# Patient Record
Sex: MALE | Race: WHITE | Employment: FULL TIME | URBAN - NONMETROPOLITAN AREA
[De-identification: names, ages, dates, MRNs, and addresses within clinical notes are randomized per-mention and may not be internally consistent; named-entity substitution may affect disease eponyms.]

---

## 2020-07-29 ENCOUNTER — HOSPITAL ENCOUNTER (EMERGENCY)
Age: 30
Discharge: HOME | End: 2020-07-29
Attending: EMERGENCY MEDICINE
Payer: COMMERCIAL

## 2020-07-29 ENCOUNTER — APPOINTMENT (OUTPATIENT)
Dept: GENERAL RADIOLOGY | Age: 30
End: 2020-07-29
Payer: COMMERCIAL

## 2020-07-29 VITALS
BODY MASS INDEX: 41.99 KG/M2 | DIASTOLIC BLOOD PRESSURE: 77 MMHG | WEIGHT: 310 LBS | OXYGEN SATURATION: 96 % | RESPIRATION RATE: 17 BRPM | TEMPERATURE: 97.5 F | HEART RATE: 68 BPM | HEIGHT: 72 IN | SYSTOLIC BLOOD PRESSURE: 135 MMHG

## 2020-07-29 PROCEDURE — 73630 X-RAY EXAM OF FOOT: CPT

## 2020-07-29 PROCEDURE — 6370000000 HC RX 637 (ALT 250 FOR IP): Performed by: EMERGENCY MEDICINE

## 2020-07-29 PROCEDURE — 73610 X-RAY EXAM OF ANKLE: CPT

## 2020-07-29 PROCEDURE — 4500000028 HC INTERMEDIATE PROCEDURE

## 2020-07-29 PROCEDURE — 99283 EMERGENCY DEPT VISIT LOW MDM: CPT

## 2020-07-29 RX ORDER — HYDROCODONE BITARTRATE AND ACETAMINOPHEN 5; 325 MG/1; MG/1
1 TABLET ORAL ONCE
Status: COMPLETED | OUTPATIENT
Start: 2020-07-29 | End: 2020-07-29

## 2020-07-29 RX ORDER — HYDROCODONE BITARTRATE AND ACETAMINOPHEN 5; 325 MG/1; MG/1
1 TABLET ORAL EVERY 4 HOURS PRN
Qty: 15 TABLET | Refills: 0 | Status: SHIPPED | OUTPATIENT
Start: 2020-07-29 | End: 2020-08-03

## 2020-07-29 RX ORDER — IBUPROFEN 600 MG/1
600 TABLET ORAL EVERY 6 HOURS PRN
Qty: 20 TABLET | Refills: 0 | Status: SHIPPED | OUTPATIENT
Start: 2020-07-29 | End: 2020-08-25

## 2020-07-29 RX ORDER — ESOMEPRAZOLE MAGNESIUM 40 MG/1
20 FOR SUSPENSION ORAL DAILY
COMMUNITY
End: 2020-08-06

## 2020-07-29 RX ORDER — TRAZODONE HYDROCHLORIDE 100 MG/1
100 TABLET ORAL NIGHTLY
COMMUNITY

## 2020-07-29 RX ORDER — IBUPROFEN 600 MG/1
600 TABLET ORAL ONCE
Status: COMPLETED | OUTPATIENT
Start: 2020-07-29 | End: 2020-07-29

## 2020-07-29 RX ORDER — MONTELUKAST SODIUM 10 MG/1
10 TABLET ORAL NIGHTLY
COMMUNITY

## 2020-07-29 RX ORDER — M-VIT,TX,IRON,MINS/CALC/FOLIC 27MG-0.4MG
1 TABLET ORAL DAILY
COMMUNITY

## 2020-07-29 RX ADMIN — IBUPROFEN 600 MG: 600 TABLET, FILM COATED ORAL at 10:29

## 2020-07-29 RX ADMIN — HYDROCODONE BITARTRATE AND ACETAMINOPHEN 1 TABLET: 5; 325 TABLET ORAL at 10:29

## 2020-07-29 ASSESSMENT — PAIN DESCRIPTION - ORIENTATION: ORIENTATION: RIGHT

## 2020-07-29 ASSESSMENT — PAIN SCALES - GENERAL
PAINLEVEL_OUTOF10: 4
PAINLEVEL_OUTOF10: 4

## 2020-07-29 ASSESSMENT — PAIN DESCRIPTION - DESCRIPTORS: DESCRIPTORS: SHOOTING

## 2020-07-29 ASSESSMENT — PAIN - FUNCTIONAL ASSESSMENT: PAIN_FUNCTIONAL_ASSESSMENT: PREVENTS OR INTERFERES SOME ACTIVE ACTIVITIES AND ADLS

## 2020-07-29 ASSESSMENT — PAIN DESCRIPTION - FREQUENCY: FREQUENCY: CONTINUOUS

## 2020-07-29 ASSESSMENT — PAIN DESCRIPTION - LOCATION: LOCATION: ANKLE

## 2020-07-29 ASSESSMENT — PAIN DESCRIPTION - PAIN TYPE: TYPE: ACUTE PAIN

## 2020-07-29 ASSESSMENT — PAIN DESCRIPTION - PROGRESSION: CLINICAL_PROGRESSION: RAPIDLY WORSENING

## 2020-07-29 ASSESSMENT — PAIN DESCRIPTION - ONSET: ONSET: SUDDEN
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
## 2020-07-30 ENCOUNTER — OFFICE VISIT (OUTPATIENT)
Dept: ORTHOPEDIC SURGERY | Age: 30
End: 2020-07-30
Payer: COMMERCIAL

## 2020-07-30 VITALS
BODY MASS INDEX: 41.99 KG/M2 | HEART RATE: 78 BPM | RESPIRATION RATE: 18 BRPM | HEIGHT: 72 IN | OXYGEN SATURATION: 96 % | WEIGHT: 310 LBS

## 2020-07-30 PROCEDURE — 99202 OFFICE O/P NEW SF 15 MIN: CPT | Performed by: PHYSICIAN ASSISTANT

## 2020-07-30 RX ORDER — ESCITALOPRAM OXALATE 10 MG/1
10 TABLET ORAL DAILY
COMMUNITY

## 2020-07-30 NOTE — PATIENT INSTRUCTIONS
Fitted for tall cam boot  Remain non weightbearing except for within the heel  Remove for hygiene  Continue to take Naproxen and Norco as directed  Follow up next week with x-rays
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
## 2020-07-31 ASSESSMENT — ENCOUNTER SYMPTOMS
GASTROINTESTINAL NEGATIVE: 1
EYES NEGATIVE: 1
RESPIRATORY NEGATIVE: 1

## 2020-08-06 ENCOUNTER — OFFICE VISIT (OUTPATIENT)
Dept: ORTHOPEDIC SURGERY | Age: 30
End: 2020-08-06
Payer: COMMERCIAL

## 2020-08-06 ENCOUNTER — HOSPITAL ENCOUNTER (OUTPATIENT)
Age: 30
Discharge: HOME | End: 2020-08-06
Payer: COMMERCIAL

## 2020-08-06 VITALS — WEIGHT: 310 LBS | RESPIRATION RATE: 18 BRPM | BODY MASS INDEX: 41.99 KG/M2 | HEIGHT: 72 IN

## 2020-08-06 PROCEDURE — U0002 COVID-19 LAB TEST NON-CDC: HCPCS

## 2020-08-06 PROCEDURE — 99214 OFFICE O/P EST MOD 30 MIN: CPT | Performed by: ORTHOPAEDIC SURGERY

## 2020-08-06 RX ORDER — OMEPRAZOLE 40 MG/1
CAPSULE, DELAYED RELEASE ORAL
COMMUNITY
Start: 2020-08-01

## 2020-08-06 NOTE — PROGRESS NOTES
Patient here for a new patient follow up per Magdalena CAMARGO for evaluation of a right foot 5th metatarsal fracture. He is NWB with crutches and short boot. He reports multiple injuries in this foot in the past but no surgeries and a right ankle fracture all treated non-surgical. He is not having much pain today but is doing pretty well staying off his foot.     DOI: 7/29/2020    Provider needs to conduct a hands on physical today due to patients injury/diagnosis

## 2020-08-06 NOTE — PATIENT INSTRUCTIONS
Right foot 5th metatarsal ORIF discussed   Will proceed with surgery   No meds list provided   Consent to be signed day of surgery   Obtain any clearances as needed, as discussed   Follow up for pre-op testing(COVID)/surgery as discussed     Call office with any questions ([facility] surgery scheduler) [phone]

## 2020-08-07 ENCOUNTER — TELEPHONE (OUTPATIENT)
Dept: ORTHOPEDIC SURGERY | Age: 30
End: 2020-08-07

## 2020-08-07 ASSESSMENT — ENCOUNTER SYMPTOMS
CHEST TIGHTNESS: 0
EYE PAIN: 0
VOMITING: 0
EYE REDNESS: 0
COLOR CHANGE: 0
SHORTNESS OF BREATH: 0
WHEEZING: 0

## 2020-08-07 NOTE — PROGRESS NOTES
8/6/2020   Chief Complaint   Patient presents with    Fracture     right foot 5th metatarsal 7/29/2020        History of Present Illness:                             Juan Patel is a 26 y.o. male who presents today for evaluation of his right foot fracture. He saw Mich Quezada last week following an injury to his right foot. He was stepping down from a high surface and felt a twisting injury with a sudden pop and pain in the lateral border of his foot. He has been immobilized in his fracture boot since the injury but has been not nonweightbearing. He does have pain and feelings of instability with rocking forward on the ball of his foot. He had no problems or pain in the area of his foot prior to the injury. Patient here for a new patient follow up per Mich CAMARGO for evaluation of a right foot 5th metatarsal fracture. He is NWB with crutches and short boot. He reports multiple injuries in this foot in the past but no surgeries and a right ankle fracture all treated non-surgical. He is not having much pain today but is doing pretty well staying off his foot.     DOI: 7/29/2020    Medical History  Patient's medications, allergies, past medical, surgical, social and family histories were reviewed and updated as appropriate. Past Medical History:   Diagnosis Date    Anxiety     Depression     No pertinent past medical history     Sleep apnea      No family history on file.   Social History     Socioeconomic History    Marital status:      Spouse name: None    Number of children: None    Years of education: None    Highest education level: None   Occupational History    None   Social Needs    Financial resource strain: None    Food insecurity     Worry: None     Inability: None    Transportation needs     Medical: None     Non-medical: None   Tobacco Use    Smoking status: Never Smoker    Smokeless tobacco: Never Used   Substance and Sexual Activity    Alcohol use: No    Drug use: No    Sexual activity: None   Lifestyle    Physical activity     Days per week: None     Minutes per session: None    Stress: None   Relationships    Social connections     Talks on phone: None     Gets together: None     Attends Yazdanism service: None     Active member of club or organization: None     Attends meetings of clubs or organizations: None     Relationship status: None    Intimate partner violence     Fear of current or ex partner: None     Emotionally abused: None     Physically abused: None     Forced sexual activity: None   Other Topics Concern    None   Social History Narrative    None     Current Outpatient Medications   Medication Sig Dispense Refill    omeprazole (PRILOSEC) 40 MG delayed release capsule       escitalopram (LEXAPRO) 10 MG tablet Take 10 mg by mouth daily      Multiple Vitamins-Minerals (THERAPEUTIC MULTIVITAMIN-MINERALS) tablet Take 1 tablet by mouth daily      montelukast (SINGULAIR) 10 MG tablet Take 10 mg by mouth nightly      traZODone (DESYREL) 100 MG tablet Take 100 mg by mouth nightly      ibuprofen (IBU) 600 MG tablet Take 1 tablet by mouth every 6 hours as needed for Pain 20 tablet 0     No current facility-administered medications for this visit. No Known Allergies    Review of Systems:   Review of Systems   Constitutional: Negative for chills and fever. HENT: Negative for congestion and sneezing. Eyes: Negative for pain and redness. Respiratory: Negative for chest tightness, shortness of breath and wheezing. Cardiovascular: Negative for chest pain and palpitations. Gastrointestinal: Negative for vomiting. Musculoskeletal: Positive for arthralgias. Skin: Negative for color change and rash. Psychiatric/Behavioral: Negative for agitation. The patient is not nervous/anxious.                                                 Examination:  General Exam:  Vitals: Resp 18   Ht 6' (1.829 m)   Wt (!) 310 lb (140.6 kg)   BMI 42.04 kg/m²    Physical Exam  Vitals signs and nursing note reviewed. Constitutional:       Appearance: Normal appearance. HENT:      Head: Normocephalic and atraumatic. Eyes:      Conjunctiva/sclera: Conjunctivae normal.      Pupils: Pupils are equal, round, and reactive to light. Neck:      Musculoskeletal: Normal range of motion. Pulmonary:      Effort: Pulmonary effort is normal.   Musculoskeletal:      Right knee: He exhibits normal range of motion, no swelling, no effusion, no ecchymosis, normal alignment, no LCL laxity, no bony tenderness and no MCL laxity. No tenderness found. No medial joint line and no lateral joint line tenderness noted. Left foot: Normal range of motion and normal capillary refill. No tenderness, bony tenderness, swelling, crepitus, deformity or laceration. Comments: Right lower extremity:  There is tenderness to palpation and swelling directly over the fifth metatarsal maximally at the fracture site of the metadiaphyseal junction. There is normal position and alignment of the foot. Mild ecchymosis present laterally at the foot. No tenderness to palpation at the ankle, heel, or forefoot. Sensation is intact light touch throughout. 2+ DP pulse and brisk cap refill present. Range of motion present at the ankle but limited due to pain referred to the lateral border of the foot. Skin:     General: Skin is warm and dry. Neurological:      Mental Status: He is alert and oriented to person, place, and time.    Psychiatric:         Mood and Affect: Mood normal.         Behavior: Behavior normal.            Diagnostic testing:  X-rays reviewed in office, I independently reviewed the films in the office today:       Office Procedures:  Orders Placed This Encounter   Procedures    COVID-19 Ambulatory     Standing Status:   Future     Standing Expiration Date:   8/6/2021     Scheduling Instructions:      Saline media preferred given current shortage of viral transport media but both acceptable     Order Specific Question:   Is this test for diagnosis or screening? Answer:   Screening     Order Specific Question:   Symptomatic for COVID-19 as defined by CDC? Answer:   No     Order Specific Question:   Reason for Test     Answer:   Upcoming elective surgery/procedure/delivery, return to work, or discharge to another facility     Order Specific Question:   Hospitalized for COVID-19? Answer:   No     Order Specific Question:   Admitted to ICU for COVID-19? Answer:   No     Order Specific Question:   Employed in healthcare setting? Answer:   No     Order Specific Question:   Resident in a congregate (group) care setting? Answer:   No     Order Specific Question:   Previously tested for COVID-19? Answer:   No       Assessment and Plan  1. Right foot Wills fifth metatarsal fracture    I reviewed the x-rays with patient explained that there is a nondisplaced fracture through the metadiaphyseal region of his fifth metatarsal.  We discussed the features of the Wills fracture that may make it difficult to heal and also potentially prone for refracture. Given his age and activity level I feel he would benefit from surgical intervention for open reduction and internal fixation with an intramedullary screw. We discussed the benefits of surgery to provide consistent healing and reduce the risk of fracture nonunion and prolonged immobilization. We discussed the risks of surgery including pain at the surgical site, wound healing problems, infection, injury to nerves. He understands risks and benefits of both surgical and nonsurgical treatment and would like to proceed with surgical intervention to stabilize his fracture. All of his questions were answered and we will proceed with open reduction and internal fixation with an intramedullary screw.     The patient was counseled at length about the risks of brandon Covid-19 during their perioperative period and any

## 2020-08-08 LAB
SARS-COV-2: NOT DETECTED
SOURCE: NORMAL

## 2020-08-10 NOTE — PROGRESS NOTES
.Surgery 8/12/20 @ 1300, arrival 1100               1. Do not eat or drink anything within 8 hours of your surgery - unless instructed by your doctor prior to surgery. This includes                   no water, chewing gum or mints. 2. Follow your directions as prescribed by the doctor for your procedure and medications. Take omeprazole in am with a sip. 3. Check with your Doctor regarding stopping Plavix, Coumadin, Lovenox,Effient,Pradaxa,Xarelto, Fragmin or other blood thinners and                   follow their instructions. 4. Do not smoke, and do not drink any alcoholic beverages 24 hours prior to surgery. This includes NA Beer. 5. You may brush your teeth and gargle the morning of surgery. DO NOT SWALLOW WATER   6. You MUST make arrangements for a responsible adult to take you home after your surgery and be able to check on you every couple                   hours for the day. You will not be allowed to leave alone or drive yourself home. It is strongly suggested someone stay with you the first 24                   hrs. Your surgery will be cancelled if you do not have a ride home. 7. Please wear simple, loose fitting clothing to the hospital.  Alvin [place] not bring valuables (money, credit cards, checkbooks, etc.) Do not wear any                   makeup (including no eye makeup) or nail polish on your fingers or toes. 8. DO NOT wear any jewelry or piercings on day of surgery. All body piercing jewelry must be removed. 9. If you have dentures, they will be removed before going to the OR; we will provide you a container. If you wear contact lenses or glasses,                  they will be removed; please bring a case for them. 10. If you  have a Living Will and Durable Power of  for Healthcare, please bring in a copy. 11. Please bring picture ID,  insurance card, paperwork from the doctors office    (H & P, Consent, & card for implantable devices). 12. Take a shower the night before or morning of your procedure, do not apply any lotion, oil or powder. 13. Wear a mask covering your nose & mouth when entering the hospital. Have your covid-19 test performed within 7 days of your                  surgery. Quarantine yourself after the test until after your surgery.

## 2020-08-11 ENCOUNTER — ANESTHESIA EVENT (OUTPATIENT)
Dept: OPERATING ROOM | Age: 30
End: 2020-08-11
Payer: COMMERCIAL

## 2020-08-11 NOTE — ANESTHESIA PRE PROCEDURE
Department of Anesthesiology  Preprocedure Note       Name:  Bridger Messina   Age:  26 y.o.  :  1990                                          MRN:  [id number]         Date:  2020      Surgeon: Joleen Amaya):  Serena Trivedi MD    Procedure: Procedure(s):  RIGHT FOOT OPEN REDUCTION INTERNAL FIXATION OF FIFTH METATARSAL BONE    Medications prior to admission:   Prior to Admission medications    Medication Sig Start Date End Date Taking? Authorizing Provider   omeprazole (PRILOSEC) 40 MG delayed release capsule  20   Historical Provider, MD   escitalopram (LEXAPRO) 10 MG tablet Take 10 mg by mouth daily    Historical Provider, MD   Multiple Vitamins-Minerals (THERAPEUTIC MULTIVITAMIN-MINERALS) tablet Take 1 tablet by mouth daily    Historical Provider, MD   montelukast (SINGULAIR) 10 MG tablet Take 10 mg by mouth nightly    Historical Provider, MD   traZODone (DESYREL) 100 MG tablet Take 100 mg by mouth nightly    Historical Provider, MD   ibuprofen (IBU) 600 MG tablet Take 1 tablet by mouth every 6 hours as needed for Pain 20  Edenilson Soto MD       Current medications:    No current facility-administered medications for this encounter.       Current Outpatient Medications   Medication Sig Dispense Refill    omeprazole (PRILOSEC) 40 MG delayed release capsule       escitalopram (LEXAPRO) 10 MG tablet Take 10 mg by mouth daily      Multiple Vitamins-Minerals (THERAPEUTIC MULTIVITAMIN-MINERALS) tablet Take 1 tablet by mouth daily      montelukast (SINGULAIR) 10 MG tablet Take 10 mg by mouth nightly      traZODone (DESYREL) 100 MG tablet Take 100 mg by mouth nightly      ibuprofen (IBU) 600 MG tablet Take 1 tablet by mouth every 6 hours as needed for Pain 20 tablet 0       Allergies:  No Known Allergies    Problem List:    Patient Active Problem List   Diagnosis Code    Ankle fracture, right B38.851D       Past Medical History:        Diagnosis Date    Anxiety     Depression     GERD [facility], [address]    Drug/Infectious Status (If Applicable):  No results found for: HIV, HEPCAB    COVID-19 Screening (If Applicable):   Lab Results   Component Value Date    COVID19 NOT DETECTED 08/06/2020         Anesthesia Evaluation  Patient summary reviewed  Airway: Mallampati: II  TM distance: >3 FB   Neck ROM: full  Mouth opening: > = 3 FB Dental:          Pulmonary: breath sounds clear to auscultation  (+) sleep apnea:                             Cardiovascular:            Rhythm: regular             Beta Blocker:  Not on Beta Blocker      ROS comment: 1. Normal left ventricular wall thickness and size present. 2.  Left ventricular function is preserved, 50% to 55% present. 3.  No pericardial effusion noted. 4.  There is mild mitral and tricuspid regurgitation noted. 5.  Diastolic function is preserved. Neuro/Psych:   (+) depression/anxiety             GI/Hepatic/Renal:   (+) GERD:, morbid obesity          Endo/Other:              Pt had no PAT visit       Abdominal:   (+) obese,         Vascular:                                    Anesthesia Plan      general     ASA 2       Induction: intravenous. MIPS: Postoperative opioids intended. Anesthetic plan and risks discussed with patient. Plan discussed with CRNA.     Attending anesthesiologist reviewed and agrees with Pre Eval content

## 2020-08-12 ENCOUNTER — ANESTHESIA (OUTPATIENT)
Dept: OPERATING ROOM | Age: 30
End: 2020-08-12
Payer: COMMERCIAL

## 2020-08-12 ENCOUNTER — APPOINTMENT (OUTPATIENT)
Dept: GENERAL RADIOLOGY | Age: 30
End: 2020-08-12
Attending: ORTHOPAEDIC SURGERY
Payer: COMMERCIAL

## 2020-08-12 ENCOUNTER — HOSPITAL ENCOUNTER (OUTPATIENT)
Age: 30
Setting detail: OUTPATIENT SURGERY
Discharge: HOME | End: 2020-08-12
Attending: ORTHOPAEDIC SURGERY | Admitting: ORTHOPAEDIC SURGERY
Payer: COMMERCIAL

## 2020-08-12 VITALS
TEMPERATURE: 98.2 F | OXYGEN SATURATION: 95 % | DIASTOLIC BLOOD PRESSURE: 120 MMHG | RESPIRATION RATE: 11 BRPM | SYSTOLIC BLOOD PRESSURE: 136 MMHG

## 2020-08-12 VITALS
BODY MASS INDEX: 41.99 KG/M2 | WEIGHT: 310 LBS | SYSTOLIC BLOOD PRESSURE: 144 MMHG | HEART RATE: 61 BPM | TEMPERATURE: 96 F | DIASTOLIC BLOOD PRESSURE: 88 MMHG | RESPIRATION RATE: 16 BRPM | HEIGHT: 72 IN | OXYGEN SATURATION: 96 %

## 2020-08-12 LAB
ANION GAP SERPL CALCULATED.3IONS-SCNC: 10 MMOL/L (ref 4–16)
BUN BLDV-MCNC: 14 MG/DL (ref 6–23)
CALCIUM SERPL-MCNC: 9.1 MG/DL (ref 8.3–10.6)
CHLORIDE BLD-SCNC: 102 MMOL/L (ref 99–110)
CO2: 25 MMOL/L (ref 21–32)
CREAT SERPL-MCNC: 1.1 MG/DL (ref 0.9–1.3)
GFR AFRICAN AMERICAN: >60 ML/MIN/1.73M2
GFR NON-AFRICAN AMERICAN: >60 ML/MIN/1.73M2
GLUCOSE BLD-MCNC: 95 MG/DL (ref 70–99)
HCT VFR BLD CALC: 40 % (ref 42–52)
HEMOGLOBIN: 13 GM/DL (ref 13.5–18)
MCH RBC QN AUTO: 26.9 PG (ref 27–31)
MCHC RBC AUTO-ENTMCNC: 32.5 % (ref 32–36)
MCV RBC AUTO: 82.6 FL (ref 78–100)
PDW BLD-RTO: 13.7 % (ref 11.7–14.9)
PLATELET # BLD: 237 K/CU MM (ref 140–440)
PMV BLD AUTO: 9 FL (ref 7.5–11.1)
POTASSIUM SERPL-SCNC: 4.2 MMOL/L (ref 3.5–5.1)
RBC # BLD: 4.84 M/CU MM (ref 4.6–6.2)
SODIUM BLD-SCNC: 137 MMOL/L (ref 135–145)
WBC # BLD: 9.4 K/CU MM (ref 4–10.5)

## 2020-08-12 PROCEDURE — 3600000003 HC SURGERY LEVEL 3 BASE: Performed by: ORTHOPAEDIC SURGERY

## 2020-08-12 PROCEDURE — 6360000002 HC RX W HCPCS: Performed by: NURSE ANESTHETIST, CERTIFIED REGISTERED

## 2020-08-12 PROCEDURE — 85027 COMPLETE CBC AUTOMATED: CPT

## 2020-08-12 PROCEDURE — 2500000003 HC RX 250 WO HCPCS: Performed by: NURSE ANESTHETIST, CERTIFIED REGISTERED

## 2020-08-12 PROCEDURE — 6360000002 HC RX W HCPCS: Performed by: ORTHOPAEDIC SURGERY

## 2020-08-12 PROCEDURE — 7100000000 HC PACU RECOVERY - FIRST 15 MIN: Performed by: ORTHOPAEDIC SURGERY

## 2020-08-12 PROCEDURE — 3700000000 HC ANESTHESIA ATTENDED CARE: Performed by: ORTHOPAEDIC SURGERY

## 2020-08-12 PROCEDURE — 3700000001 HC ADD 15 MINUTES (ANESTHESIA): Performed by: ORTHOPAEDIC SURGERY

## 2020-08-12 PROCEDURE — 28485 OPTX METATARSAL FX EACH: CPT | Performed by: ORTHOPAEDIC SURGERY

## 2020-08-12 PROCEDURE — 2500000003 HC RX 250 WO HCPCS: Performed by: ORTHOPAEDIC SURGERY

## 2020-08-12 PROCEDURE — 2580000003 HC RX 258: Performed by: ANESTHESIOLOGY

## 2020-08-12 PROCEDURE — 80048 BASIC METABOLIC PNL TOTAL CA: CPT

## 2020-08-12 PROCEDURE — 2720000010 HC SURG SUPPLY STERILE: Performed by: ORTHOPAEDIC SURGERY

## 2020-08-12 PROCEDURE — 6360000002 HC RX W HCPCS: Performed by: ANESTHESIOLOGY

## 2020-08-12 PROCEDURE — 3600000013 HC SURGERY LEVEL 3 ADDTL 15MIN: Performed by: ORTHOPAEDIC SURGERY

## 2020-08-12 PROCEDURE — 2780000010 HC IMPLANT OTHER: Performed by: ORTHOPAEDIC SURGERY

## 2020-08-12 PROCEDURE — 7100000011 HC PHASE II RECOVERY - ADDTL 15 MIN: Performed by: ORTHOPAEDIC SURGERY

## 2020-08-12 PROCEDURE — 7100000010 HC PHASE II RECOVERY - FIRST 15 MIN: Performed by: ORTHOPAEDIC SURGERY

## 2020-08-12 PROCEDURE — 2709999900 HC NON-CHARGEABLE SUPPLY: Performed by: ORTHOPAEDIC SURGERY

## 2020-08-12 PROCEDURE — 7100000001 HC PACU RECOVERY - ADDTL 15 MIN: Performed by: ORTHOPAEDIC SURGERY

## 2020-08-12 RX ORDER — FENTANYL CITRATE 50 UG/ML
25 INJECTION, SOLUTION INTRAMUSCULAR; INTRAVENOUS EVERY 5 MIN PRN
Status: DISCONTINUED | OUTPATIENT
Start: 2020-08-12 | End: 2020-08-12 | Stop reason: HOSPADM

## 2020-08-12 RX ORDER — PROPOFOL 10 MG/ML
INJECTION, EMULSION INTRAVENOUS PRN
Status: DISCONTINUED | OUTPATIENT
Start: 2020-08-12 | End: 2020-08-12 | Stop reason: SDUPTHER

## 2020-08-12 RX ORDER — SODIUM CHLORIDE, SODIUM LACTATE, POTASSIUM CHLORIDE, CALCIUM CHLORIDE 600; 310; 30; 20 MG/100ML; MG/100ML; MG/100ML; MG/100ML
INJECTION, SOLUTION INTRAVENOUS CONTINUOUS
Status: DISCONTINUED | OUTPATIENT
Start: 2020-08-12 | End: 2020-08-12 | Stop reason: HOSPADM

## 2020-08-12 RX ORDER — ONDANSETRON 2 MG/ML
4 INJECTION INTRAMUSCULAR; INTRAVENOUS
Status: DISCONTINUED | OUTPATIENT
Start: 2020-08-12 | End: 2020-08-12 | Stop reason: HOSPADM

## 2020-08-12 RX ORDER — CEFAZOLIN SODIUM 2 G/100ML
2 INJECTION, SOLUTION INTRAVENOUS
Status: COMPLETED | OUTPATIENT
Start: 2020-08-12 | End: 2020-08-12

## 2020-08-12 RX ORDER — MIDAZOLAM HYDROCHLORIDE 1 MG/ML
INJECTION INTRAMUSCULAR; INTRAVENOUS PRN
Status: DISCONTINUED | OUTPATIENT
Start: 2020-08-12 | End: 2020-08-12 | Stop reason: SDUPTHER

## 2020-08-12 RX ORDER — FENTANYL CITRATE 50 UG/ML
INJECTION, SOLUTION INTRAMUSCULAR; INTRAVENOUS PRN
Status: DISCONTINUED | OUTPATIENT
Start: 2020-08-12 | End: 2020-08-12 | Stop reason: SDUPTHER

## 2020-08-12 RX ORDER — KETAMINE HYDROCHLORIDE 10 MG/ML
INJECTION, SOLUTION INTRAMUSCULAR; INTRAVENOUS PRN
Status: DISCONTINUED | OUTPATIENT
Start: 2020-08-12 | End: 2020-08-12 | Stop reason: SDUPTHER

## 2020-08-12 RX ORDER — DEXAMETHASONE SODIUM PHOSPHATE 4 MG/ML
INJECTION, SOLUTION INTRA-ARTICULAR; INTRALESIONAL; INTRAMUSCULAR; INTRAVENOUS; SOFT TISSUE PRN
Status: DISCONTINUED | OUTPATIENT
Start: 2020-08-12 | End: 2020-08-12 | Stop reason: SDUPTHER

## 2020-08-12 RX ORDER — FENTANYL CITRATE 50 UG/ML
50 INJECTION, SOLUTION INTRAMUSCULAR; INTRAVENOUS EVERY 5 MIN PRN
Status: DISCONTINUED | OUTPATIENT
Start: 2020-08-12 | End: 2020-08-12 | Stop reason: HOSPADM

## 2020-08-12 RX ORDER — ONDANSETRON 2 MG/ML
INJECTION INTRAMUSCULAR; INTRAVENOUS PRN
Status: DISCONTINUED | OUTPATIENT
Start: 2020-08-12 | End: 2020-08-12 | Stop reason: SDUPTHER

## 2020-08-12 RX ORDER — LABETALOL HYDROCHLORIDE 5 MG/ML
5 INJECTION, SOLUTION INTRAVENOUS EVERY 10 MIN PRN
Status: DISCONTINUED | OUTPATIENT
Start: 2020-08-12 | End: 2020-08-12 | Stop reason: HOSPADM

## 2020-08-12 RX ORDER — HYDROCODONE BITARTRATE AND ACETAMINOPHEN 5; 325 MG/1; MG/1
2 TABLET ORAL EVERY 6 HOURS PRN
Qty: 30 TABLET | Refills: 0 | Status: SHIPPED | OUTPATIENT
Start: 2020-08-12 | End: 2020-08-19

## 2020-08-12 RX ORDER — LIDOCAINE HYDROCHLORIDE 20 MG/ML
INJECTION, SOLUTION INTRAVENOUS PRN
Status: DISCONTINUED | OUTPATIENT
Start: 2020-08-12 | End: 2020-08-12 | Stop reason: SDUPTHER

## 2020-08-12 RX ORDER — BUPIVACAINE HYDROCHLORIDE 5 MG/ML
INJECTION, SOLUTION EPIDURAL; INTRACAUDAL
Status: COMPLETED | OUTPATIENT
Start: 2020-08-12 | End: 2020-08-12

## 2020-08-12 RX ORDER — HYDRALAZINE HYDROCHLORIDE 20 MG/ML
5 INJECTION INTRAMUSCULAR; INTRAVENOUS EVERY 10 MIN PRN
Status: DISCONTINUED | OUTPATIENT
Start: 2020-08-12 | End: 2020-08-12 | Stop reason: HOSPADM

## 2020-08-12 RX ADMIN — SODIUM CHLORIDE, POTASSIUM CHLORIDE, SODIUM LACTATE AND CALCIUM CHLORIDE: 600; 310; 30; 20 INJECTION, SOLUTION INTRAVENOUS at 10:07

## 2020-08-12 RX ADMIN — KETAMINE HYDROCHLORIDE 40 MG: 10 INJECTION INTRAMUSCULAR; INTRAVENOUS at 13:00

## 2020-08-12 RX ADMIN — CEFAZOLIN SODIUM 3 G: 2 INJECTION, SOLUTION INTRAVENOUS at 13:12

## 2020-08-12 RX ADMIN — FENTANYL CITRATE 100 MCG: 50 INJECTION INTRAMUSCULAR; INTRAVENOUS at 13:00

## 2020-08-12 RX ADMIN — DEXAMETHASONE SODIUM PHOSPHATE 8 MG: 4 INJECTION, SOLUTION INTRAMUSCULAR; INTRAVENOUS at 13:16

## 2020-08-12 RX ADMIN — FENTANYL CITRATE 25 MCG: 50 INJECTION INTRAMUSCULAR; INTRAVENOUS at 13:41

## 2020-08-12 RX ADMIN — LIDOCAINE HYDROCHLORIDE 60 MG: 20 INJECTION, SOLUTION INTRAVENOUS at 13:00

## 2020-08-12 RX ADMIN — FENTANYL CITRATE 25 MCG: 50 INJECTION INTRAMUSCULAR; INTRAVENOUS at 15:04

## 2020-08-12 RX ADMIN — MIDAZOLAM 2 MG: 1 INJECTION INTRAMUSCULAR; INTRAVENOUS at 12:58

## 2020-08-12 RX ADMIN — ONDANSETRON 4 MG: 2 INJECTION INTRAMUSCULAR; INTRAVENOUS at 13:14

## 2020-08-12 RX ADMIN — FENTANYL CITRATE 50 MCG: 50 INJECTION INTRAMUSCULAR; INTRAVENOUS at 13:18

## 2020-08-12 RX ADMIN — FENTANYL CITRATE 25 MCG: 50 INJECTION INTRAMUSCULAR; INTRAVENOUS at 13:33

## 2020-08-12 RX ADMIN — FENTANYL CITRATE 25 MCG: 50 INJECTION INTRAMUSCULAR; INTRAVENOUS at 14:57

## 2020-08-12 RX ADMIN — KETAMINE HYDROCHLORIDE 10 MG: 10 INJECTION INTRAMUSCULAR; INTRAVENOUS at 13:16

## 2020-08-12 RX ADMIN — PROPOFOL 400 MG: 10 INJECTION, EMULSION INTRAVENOUS at 13:00

## 2020-08-12 ASSESSMENT — PULMONARY FUNCTION TESTS
PIF_VALUE: 12
PIF_VALUE: 14
PIF_VALUE: 0
PIF_VALUE: 10
PIF_VALUE: 12
PIF_VALUE: 12
PIF_VALUE: 11
PIF_VALUE: 10
PIF_VALUE: 18
PIF_VALUE: 12
PIF_VALUE: 12
PIF_VALUE: 34
PIF_VALUE: 8
PIF_VALUE: 13
PIF_VALUE: 12
PIF_VALUE: 13
PIF_VALUE: 12
PIF_VALUE: 4
PIF_VALUE: 12
PIF_VALUE: 1
PIF_VALUE: 12
PIF_VALUE: 10
PIF_VALUE: 12
PIF_VALUE: 0
PIF_VALUE: 7
PIF_VALUE: 3
PIF_VALUE: 12
PIF_VALUE: 6
PIF_VALUE: 5
PIF_VALUE: 12
PIF_VALUE: 5
PIF_VALUE: 9
PIF_VALUE: 0
PIF_VALUE: 12
PIF_VALUE: 9
PIF_VALUE: 12
PIF_VALUE: 20
PIF_VALUE: 8
PIF_VALUE: 11
PIF_VALUE: 3
PIF_VALUE: 12
PIF_VALUE: 11
PIF_VALUE: 14
PIF_VALUE: 28
PIF_VALUE: 0
PIF_VALUE: 2
PIF_VALUE: 10
PIF_VALUE: 14
PIF_VALUE: 13
PIF_VALUE: 34
PIF_VALUE: 13
PIF_VALUE: 11
PIF_VALUE: 10
PIF_VALUE: 12
PIF_VALUE: 11
PIF_VALUE: 12
PIF_VALUE: 1
PIF_VALUE: 5

## 2020-08-12 ASSESSMENT — PAIN - FUNCTIONAL ASSESSMENT: PAIN_FUNCTIONAL_ASSESSMENT: 0-10

## 2020-08-12 ASSESSMENT — PAIN DESCRIPTION - ORIENTATION
ORIENTATION: RIGHT
ORIENTATION: RIGHT

## 2020-08-12 ASSESSMENT — PAIN SCALES - GENERAL
PAINLEVEL_OUTOF10: 6
PAINLEVEL_OUTOF10: 4
PAINLEVEL_OUTOF10: 5
PAINLEVEL_OUTOF10: 4

## 2020-08-12 ASSESSMENT — PAIN DESCRIPTION - PAIN TYPE
TYPE: SURGICAL PAIN
TYPE: SURGICAL PAIN

## 2020-08-12 NOTE — PROGRESS NOTES
[id number] patient received from the OR monitor placed and alarms on.  Report received from Fercho Murrell 53   7500 Handoff given to [address] [place] [address]

## 2020-08-12 NOTE — BRIEF OP NOTE
Brief Postoperative Note      Patient: Mariely Santana  YOB: 1990  MRN: [id number]    Date of Procedure: 8/12/2020    Pre-Op Diagnosis: Nondisplaced fracture of fifth metatarsal bone, RIGHT foot    Post-Op Diagnosis: Same       Procedure(s):  RIGHT FOOT OPEN REDUCTION INTERNAL FIXATION OF FIFTH METATARSAL BONE    Surgeon(s):  Екатерина Bravo MD    Assistant:  * No surgical staff found *    Anesthesia: General    Estimated Blood Loss (mL): Minimal    Complications: None    Specimens:   * No specimens in log *    Implants:  * No implants in log *     Arthrex 5.5mm x 60mm screw      Drains: * No LDAs found *    Findings:     Electronically signed by Екатерина Bravo MD on 8/12/2020 at 2:01 PM

## 2020-08-12 NOTE — OP NOTE
[address], [address] [facility]                                OPERATIVE REPORT    PATIENT NAME: Paulette Willett                     :        1990  MED REC NO:   [id number]                          ROOM:  ACCOUNT NO:   [de-identified]                           ADMIT DATE: 2020  PROVIDER:     Paula Cruz MD    DATE OF PROCEDURE:  2020    PREOPERATIVE DIAGNOSIS:  Right foot fifth metatarsal nondisplaced Wills  fractures. POSTOPERATIVE DIAGNOSIS:  Right foot fifth metatarsal nondisplaced Wills  fractures. PROCEDURE:  Open reduction and internal fixation of fifth metatarsal  fracture right foot. SURGEON:  Paula Cruz MD    ANESTHESIA:  General.    BLOOD LOSS:  Minimal.    COMPLICATIONS:  None. DISPOSITION:  To PACU in stable condition. OPERATIVE IMPLANT:  Arthrex 5.5-mm diameter solid partially threaded  screw, measurements 68 mm in length. PREOPERATIVE INDICATIONS:  The patient is a 24-year-old who had a  twisting injury to his foot and had immediate pain and swelling,  inability to bear weight and x-rays were taken which revealed a fracture  in the _____ region of the fifth metatarsal.  He presented to my office  and I had a long discussion with him regarding treatment options both  operative and nonoperative treatment. Given his size and activity  level, I recommended surgical intervention for open reduction and  internal fixation. We discussed the risks, benefits and alternatives to  surgery as well as the postoperative course. He understood and consent  was obtained. OPERATIVE PROCEDURE:  The patient was identified in the preoperative  area and the site was marked. He was taken back to the operating room  and placed supine on the table. After induction of general endotracheal  anesthesia, the right lower extremity was prepped and draped in sterile  fashion with a thigh tourniquet.   A time-out was performed. Preoperative antibiotics were given. The leg was exsanguinated with an  Esmarch and tourniquet inflated to 300 mmHg and deflated at the  conclusion of the case after less than 45 minutes of tourniquet time. An incision was made over the proximal aspect of the fifth metatarsal  base and dissection was carried down through subcutaneous tissues and  bleeding was controlled with the Bovie. Care was taken to protect the  peroneal tendons. A guidewire for the Arthrex screw was advanced into position across the  fracture site through the intramedullary canal of the fifth metatarsal  starting at the base of the fifth metatarsal.  The guidewire was  advanced into position and verified in multiple views with the mini  C-arm. The guidewire was then overdrilled and measured for a 60 mm  length screw. The guidewire was then tapped with the 5.5 mm tap and the  Arthrex solid partially threaded 60 mm length screw was advanced into  position with excellent purchase and fixation across the fracture site  and excellent compression as well. The x-rays were taken in multiple  views to ensure excellent position and alignment of the fracture and  screw. The wound was then thoroughly irrigated. Deep layer was closed  with buried 2-0 Vicryl. Skin was closed with interrupted 3-0 nylon and  tourniquet was deflated. Bleeding was well controlled. Sterile  dressing was applied with Xeroform, 4 x 8s, sterile Webril with  well-padded posterior splint. The patient was extubated and taken to  PACU in stable condition. All sponge and needle counts were correct. POSTOPERATIVE PLAN:  He will remain nonweightbearing and he will be in a  splint for 4 days and then removing at home and transition to the  protective walking boot and remain nonweightbearing at that time.   He  will follow up in the office in 2 weeks for x-rays and suture removal.        Gagandeep Dueñas MD    D: 08/12/2020 14:14:03       T:

## 2020-08-12 NOTE — H&P
Smokeless tobacco: Never Used   Substance and Sexual Activity    Alcohol use: No    Drug use: No    Sexual activity: None   Lifestyle    Physical activity       Days per week: None       Minutes per session: None    Stress: None   Relationships    Social connections       Talks on phone: None       Gets together: None       Attends Advent service: None       Active member of club or organization: None       Attends meetings of clubs or organizations: None       Relationship status: None    Intimate partner violence       Fear of current or ex partner: None       Emotionally abused: None       Physically abused: None       Forced sexual activity: None   Other Topics Concern    None   Social History Narrative    None        Current Facility-Administered Medications          Current Outpatient Medications   Medication Sig Dispense Refill    omeprazole (PRILOSEC) 40 MG delayed release capsule          escitalopram (LEXAPRO) 10 MG tablet Take 10 mg by mouth daily        Multiple Vitamins-Minerals (THERAPEUTIC MULTIVITAMIN-MINERALS) tablet Take 1 tablet by mouth daily        montelukast (SINGULAIR) 10 MG tablet Take 10 mg by mouth nightly        traZODone (DESYREL) 100 MG tablet Take 100 mg by mouth nightly        ibuprofen (IBU) 600 MG tablet Take 1 tablet by mouth every 6 hours as needed for Pain 20 tablet 0      No current facility-administered medications for this visit. No Known Allergies     Review of Systems:   Review of Systems   Constitutional: Negative for chills and fever. HENT: Negative for congestion and sneezing. Eyes: Negative for pain and redness. Respiratory: Negative for chest tightness, shortness of breath and wheezing. Cardiovascular: Negative for chest pain and palpitations. Gastrointestinal: Negative for vomiting. Musculoskeletal: Positive for arthralgias. Skin: Negative for color change and rash. Psychiatric/Behavioral: Negative for agitation.  The patient is not nervous/anxious.                                                  Examination:  General Exam:  Vitals: Resp 18   Ht 6' (1.829 m)   Wt (!) 310 lb (140.6 kg)   BMI 42.04 kg/m²    Physical Exam  Vitals signs and nursing note reviewed. Constitutional:       Appearance: Normal appearance. HENT:      Head: Normocephalic and atraumatic. Eyes:      Conjunctiva/sclera: Conjunctivae normal.      Pupils: Pupils are equal, round, and reactive to light. Neck:      Musculoskeletal: Normal range of motion. Pulmonary:      Effort: Pulmonary effort is normal.   Musculoskeletal:      Right knee: He exhibits normal range of motion, no swelling, no effusion, no ecchymosis, normal alignment, no LCL laxity, no bony tenderness and no MCL laxity. No tenderness found. No medial joint line and no lateral joint line tenderness noted. Left foot: Normal range of motion and normal capillary refill. No tenderness, bony tenderness, swelling, crepitus, deformity or laceration. Comments: Right lower extremity:  There is tenderness to palpation and swelling directly over the fifth metatarsal maximally at the fracture site of the metadiaphyseal junction. There is normal position and alignment of the foot. Mild ecchymosis present laterally at the foot. No tenderness to palpation at the ankle, heel, or forefoot. Sensation is intact light touch throughout. 2+ DP pulse and brisk cap refill present. Range of motion present at the ankle but limited due to pain referred to the lateral border of the foot. Skin:     General: Skin is warm and dry. Neurological:      Mental Status: He is alert and oriented to person, place, and time.    Psychiatric:         Mood and Affect: Mood normal.         Behavior: Behavior normal.               Diagnostic testing:  X-rays reviewed in office, I independently reviewed the films in the office today:         Office Procedures:         Orders Placed This Encounter   Procedures    COVID-19 Ambulatory       Standing Status:   Future       Standing Expiration Date:   8/6/2021       Scheduling Instructions:         Saline media preferred given current shortage of viral transport media but both acceptable       Order Specific Question:   Is this test for diagnosis or screening?       Answer:   Screening       Order Specific Question:   Symptomatic for COVID-19 as defined by CDC?       Answer: No       Order Specific Question:   Reason for Test       Answer:   Upcoming elective surgery/procedure/delivery, return to work, or discharge to another facility       Order Specific Question:   Hospitalized for COVID-19?       Answer: No       Order Specific Question:   Admitted to ICU for COVID-19?       Answer: No       Order Specific Question:   Employed in healthcare setting?       Answer: No       Order Specific Question:   Resident in a congregate (group) care setting?       Answer: No       Order Specific Question:   Previously tested for COVID-19?       Answer: No        Assessment and Plan  1. Right foot Wills fifth metatarsal fracture     I reviewed the x-rays with patient explained that there is a nondisplaced fracture through the metadiaphyseal region of his fifth metatarsal.  We discussed the features of the Wills fracture that may make it difficult to heal and also potentially prone for refracture. Given his age and activity level I feel he would benefit from surgical intervention for open reduction and internal fixation with an intramedullary screw. We discussed the benefits of surgery to provide consistent healing and reduce the risk of fracture nonunion and prolonged immobilization. We discussed the risks of surgery including pain at the surgical site, wound healing problems, infection, injury to nerves.     He understands risks and benefits of both surgical and nonsurgical treatment and would like to proceed with surgical intervention to stabilize his fracture.   All of his questions were answered and we will proceed with open reduction and internal fixation with an intramedullary screw.     The patient was counseled at length about the risks of brandon Covid-19 during their perioperative period and any recovery window from their procedure.  The patient was made aware that brandon Covid-19  may worsen their prognosis for recovering from their procedure  and lend to a higher morbidity and/or mortality risk.  All material risks, benefits, and reasonable alternatives including postponing the procedure were discussed. The patient does wish to proceed with the procedure at this time.     History and Physical exam note from the office visit is copied above from epic. I have reviewed the note and examined the patient and find no relevant changes.      I have reviewed with the patient and/or family the risks, benefits, and alternatives to the procedure as well as expected postoperative course    Blanka Sprageu MD

## 2020-08-25 ENCOUNTER — OFFICE VISIT (OUTPATIENT)
Dept: ORTHOPEDIC SURGERY | Age: 30
End: 2020-08-25

## 2020-08-25 VITALS — WEIGHT: 300 LBS | RESPIRATION RATE: 20 BRPM | HEIGHT: 72 IN | BODY MASS INDEX: 40.63 KG/M2

## 2020-08-25 PROCEDURE — 99024 POSTOP FOLLOW-UP VISIT: CPT | Performed by: ORTHOPAEDIC SURGERY

## 2020-08-25 ASSESSMENT — ENCOUNTER SYMPTOMS
EYE REDNESS: 0
CHEST TIGHTNESS: 0
WHEEZING: 0
COLOR CHANGE: 0
SHORTNESS OF BREATH: 0
VOMITING: 0
EYE PAIN: 0

## 2020-08-25 NOTE — PATIENT INSTRUCTIONS
Sutures removed   Weightbearing as tolerated in the boot   Wean off crutches as tolerated   Follow up in 3 weeks with x-rays

## 2020-08-25 NOTE — PROGRESS NOTES
8/25/2020   Chief Complaint   Patient presents with    Post-Op Check     s/p 2 week ORIF 5th Metatarsal right foot DOS 8/12/20        History of Present Illness:                             Alexis Chaidez is a 26 y.o. male returns today for his first postoperative visit after open reduction and internal fixation of the right fifth metatarsal.  He has done well in his boot with crutches. He denies any problems or pain at this time. Patient here postoperatively for his right foot 5th metatarsal castellanos fracture ORIF. He is NWB with boot and crutches. He is doing well with no pain.      Date of surgery: 8/12/2020    Medical History  Patient's medications, allergies, past medical, surgical, social and family histories were reviewed and updated as appropriate.     Past Medical History:   Diagnosis Date    Anxiety     Depression     GERD (gastroesophageal reflux disease)     Takes omeprazole    No pertinent past medical history     Sleep apnea     CPAP - no pulmonologist     Family History   Problem Relation Age of Onset    No Known Problems Mother     No Known Problems Father      Social History     Socioeconomic History    Marital status:      Spouse name: None    Number of children: None    Years of education: None    Highest education level: None   Occupational History    None   Social Needs    Financial resource strain: None    Food insecurity     Worry: None     Inability: None    Transportation needs     Medical: None     Non-medical: None   Tobacco Use    Smoking status: Never Smoker    Smokeless tobacco: Never Used   Substance and Sexual Activity    Alcohol use: No    Drug use: No    Sexual activity: None   Lifestyle    Physical activity     Days per week: None     Minutes per session: None    Stress: None   Relationships    Social connections     Talks on phone: None     Gets together: None     Attends Druze service: None     Active member of club or organization: None Attends meetings of clubs or organizations: None     Relationship status: None    Intimate partner violence     Fear of current or ex partner: None     Emotionally abused: None     Physically abused: None     Forced sexual activity: None   Other Topics Concern    None   Social History Narrative    None     Current Outpatient Medications   Medication Sig Dispense Refill    omeprazole (PRILOSEC) 40 MG delayed release capsule       escitalopram (LEXAPRO) 10 MG tablet Take 10 mg by mouth daily      Multiple Vitamins-Minerals (THERAPEUTIC MULTIVITAMIN-MINERALS) tablet Take 1 tablet by mouth daily      montelukast (SINGULAIR) 10 MG tablet Take 10 mg by mouth nightly      traZODone (DESYREL) 100 MG tablet Take 100 mg by mouth nightly       No current facility-administered medications for this visit. No Known Allergies    Review of Systems:   Review of Systems   Constitutional: Negative for chills and fever. HENT: Negative for congestion and sneezing. Eyes: Negative for pain and redness. Respiratory: Negative for chest tightness, shortness of breath and wheezing. Cardiovascular: Negative for chest pain and palpitations. Gastrointestinal: Negative for vomiting. Skin: Negative for color change and rash. Psychiatric/Behavioral: Negative for agitation. The patient is not nervous/anxious. Examination:  General Exam:  Vitals: Resp 20   Ht 6' (1.829 m)   Wt 300 lb (136.1 kg)   BMI 40.69 kg/m²    Physical Exam  Vitals signs and nursing note reviewed. Constitutional:       Appearance: Normal appearance. HENT:      Head: Normocephalic and atraumatic. Eyes:      Conjunctiva/sclera: Conjunctivae normal.      Pupils: Pupils are equal, round, and reactive to light. Neck:      Musculoskeletal: Normal range of motion.    Pulmonary:      Effort: Pulmonary effort is normal.   Musculoskeletal:      Right knee: He exhibits normal range of motion, no well    Electronically signed by Gt Guerra MD on 8/25/2020 at 11:58 AM

## 2020-09-17 ENCOUNTER — OFFICE VISIT (OUTPATIENT)
Dept: ORTHOPEDIC SURGERY | Age: 30
End: 2020-09-17

## 2020-09-17 VITALS — HEIGHT: 72 IN | BODY MASS INDEX: 41.31 KG/M2 | RESPIRATION RATE: 18 BRPM | WEIGHT: 305 LBS

## 2020-09-17 PROCEDURE — 99024 POSTOP FOLLOW-UP VISIT: CPT | Performed by: ORTHOPAEDIC SURGERY

## 2020-09-17 NOTE — PATIENT INSTRUCTIONS
Weightbearing as tolerated as pain allows   Continue regular shoe-wear, recommend comfortable sturdy shoes   Elevate for swelling   Continue off work for 3 weeks-will have RTW date at next visit   Follow up in 3 weeks with x-rays

## 2020-09-17 NOTE — PROGRESS NOTES
Patient here postoperatively about 5 weeks s/p right 5th metatarsal ORIF. He is continuing to improve with some mild pain while WB. He is in regular shoes now. No new issues. He is still off work.        Date of surgery: 8/12/2020      Provider needs to conduct a hands on physical today due to patients injury/diagnosis
supportive shoes and gradually increase his walking and standing activities with the goal of returning to work in the next few weeks. I recommend he remain off of work for the next 3 weeks while doing a home exercise program and then follow-up at that time for repeat x-rays and we will determine if he is ready to return to work.         Electronically signed by Eve Madrigal MD on 9/17/2020 at 10:08 AM

## 2020-09-29 ENCOUNTER — OFFICE VISIT (OUTPATIENT)
Dept: ORTHOPEDIC SURGERY | Age: 30
End: 2020-09-29

## 2020-09-29 VITALS
TEMPERATURE: 98.2 F | RESPIRATION RATE: 15 BRPM | BODY MASS INDEX: 41.31 KG/M2 | OXYGEN SATURATION: 97 % | WEIGHT: 305 LBS | HEIGHT: 72 IN | HEART RATE: 65 BPM

## 2020-09-29 PROCEDURE — 99024 POSTOP FOLLOW-UP VISIT: CPT | Performed by: ORTHOPAEDIC SURGERY

## 2020-09-29 RX ORDER — CEPHALEXIN 500 MG/1
500 CAPSULE ORAL 4 TIMES DAILY
Qty: 28 CAPSULE | Refills: 0 | Status: SHIPPED | OUTPATIENT
Start: 2020-09-29 | End: 2020-10-15

## 2020-09-29 NOTE — LETTER
[address] [facility]  [address] [facility]  Phone: [phone]  Fax: [phone]    Chip Jenkins MD        September 29, 2020     Patient: Kavon Parker   YOB: 1990   Date of Visit: 9/29/2020       To Whom It May Concern: It is my medical opinion that Mario Charlton should remain out of work until 10/17/20. If you have any questions or concerns, please don't hesitate to call.     Sincerely,        Chip Jenkins MD

## 2020-09-29 NOTE — PATIENT INSTRUCTIONS
Continue weight-bearing as tolerated. Continue range of motion exercises as instructed. Ice and elevate as needed. Tylenol or Motrin for pain.   continue to wear regular shoes   continue off work for 2 more weeks  Follow up in 2 weeks

## 2020-10-06 ENCOUNTER — TELEPHONE (OUTPATIENT)
Dept: ORTHOPEDIC SURGERY | Age: 30
End: 2020-10-06

## 2020-10-06 NOTE — TELEPHONE ENCOUNTER
Pt lvm at our office stating Yesi Rivera did not receive the fax. I refaxed it. I lvm at Krzysztof's number letting him know I refaxed it and I would let Milly Majano know.

## 2020-10-10 ASSESSMENT — ENCOUNTER SYMPTOMS
COLOR CHANGE: 1
CHEST TIGHTNESS: 0
SHORTNESS OF BREATH: 0

## 2020-10-10 NOTE — PROGRESS NOTES
Pt returns today for post op check of the right foot 5th metatarsal ORIF DOS 8/12/20. Pt states last week he went on vacation and wore new boots. When walking he noticed some increased pressure in the lateral aspect of this foot. He removed his boot and felt instant pressure release. Pt states he removed his bandage and he has yellow puss coming from his wound. Pt states he did apply pressure to the incision and more puss come out. Pt states since then he has not noticed any puss.  Pt denies any chills or fever
Examination:  General Exam:  Vitals: Pulse 65   Temp 98.2 °F (36.8 °C)   Resp 15   Ht 6' (1.829 m)   Wt (!) 305 lb (138.3 kg)   SpO2 97%   BMI 41.37 kg/m²    Physical Exam   Right lower extremity:  There is localized swelling tenderness and erythema at the healing surgical site of the lateral border of the foot. There is an area of induration and purulence beneath the skin at an isolated site near 1 of the Vicryl sutures. There is no instability of the foot. Good active range of motion and strength. Sensation motor functions intact. No proximal streaking or erythema. Diagnostic testing:  X-rays reviewed in office, I independently reviewed the films in the office today:   X-ray images show a stable alignment of the fracture site with screw in place and bony healing present. Office Procedures:  No orders of the defined types were placed in this encounter. Assessment and Plan  1. Right foot fifth metatarsal open reduction and internal fixation of Wills fracture    2. Right foot postoperative wound infection    I explained the patient that it appears that he is having infectious process superficially at his healing surgical site. This appears to be localized to the superficial skin and soft tissues. There is no evidence of deep infection or spreading to more proximal structures. I recommended we continue with nonoperative treatment of this problem. Today I performed a debridement of the second indurated area and express purulent material from the superficial structures at the healing surgical scar. The area was cleaned with Betadine and then forceps and scissors used to unroofed the small abscess. The area was then flushed with normal saline and a Band-Aid was applied. He will continue with local wound care and cleaning with soap and water and peroxide as needed continue with antibiotic ointment and bandage changes twice a day.     He can

## 2020-10-15 ENCOUNTER — OFFICE VISIT (OUTPATIENT)
Dept: ORTHOPEDIC SURGERY | Age: 30
End: 2020-10-15

## 2020-10-15 VITALS — BODY MASS INDEX: 41.31 KG/M2 | RESPIRATION RATE: 14 BRPM | HEIGHT: 72 IN | WEIGHT: 305 LBS

## 2020-10-15 PROCEDURE — 99024 POSTOP FOLLOW-UP VISIT: CPT | Performed by: ORTHOPAEDIC SURGERY

## 2020-10-16 NOTE — PROGRESS NOTES
Patient is here today for a wound check for his right foot ORIF completed 8/12/20. He states that his symptoms have greatly improved. Pain level 0/10. He completed his antibiotics and has not new concerns or issues.
activities as tolerated. I have recommended continue to wear supportive shoes and avoiding strenuous heavy painful or repetitive activities. He is currently functioning well would like to return to work. Have given him a note to return to work without restrictions.   I would like him to follow-up in 6 weeks for repeat x-rays and check of his progress        Electronically signed by Benita Lockwood MD on 10/16/2020 at 7:41 AM

## 2022-11-15 ENCOUNTER — TELEPHONE (OUTPATIENT)
Dept: ORTHOPEDIC SURGERY | Age: 32
End: 2022-11-15

## 2022-11-15 ENCOUNTER — OFFICE VISIT (OUTPATIENT)
Dept: ORTHOPEDIC SURGERY | Age: 32
End: 2022-11-15
Payer: COMMERCIAL

## 2022-11-15 VITALS
DIASTOLIC BLOOD PRESSURE: 88 MMHG | BODY MASS INDEX: 41.31 KG/M2 | RESPIRATION RATE: 15 BRPM | SYSTOLIC BLOOD PRESSURE: 128 MMHG | HEIGHT: 72 IN | WEIGHT: 305 LBS

## 2022-11-15 DIAGNOSIS — S83.91XA SPRAIN OF RIGHT KNEE, UNSPECIFIED LIGAMENT, INITIAL ENCOUNTER: Primary | ICD-10-CM

## 2022-11-15 PROCEDURE — 99214 OFFICE O/P EST MOD 30 MIN: CPT | Performed by: ORTHOPAEDIC SURGERY

## 2022-11-15 ASSESSMENT — ENCOUNTER SYMPTOMS
VOMITING: 0
COLOR CHANGE: 0
WHEEZING: 0
EYE PAIN: 0
EYE REDNESS: 0
SHORTNESS OF BREATH: 0
CHEST TIGHTNESS: 0

## 2022-11-15 NOTE — PROGRESS NOTES
Patient presents to the office today for evaluation of the right knee injury [facility] DOI 11/9/22. Pt states he was a work when he fell and landed on his right knee. Pt states his knee twisted and felt two pops in the right knee. Pt states since then he has had increase pain along the medial aspect of the knee. pt states he feel like his knee is going to give out on him. Pt has been taking naproxen and using ice with very mild relief.

## 2022-11-15 NOTE — PATIENT INSTRUCTIONS
Continue weight-bearing as tolerated. Continue range of motion exercises as instructed. Ice and elevate as needed. Tylenol or Motrin for pain.    MRI of the right knee ordered  Once approved we will call you to get you scheduled

## 2022-11-15 NOTE — LETTER
[facility]  [address] [place] [address]  Phone: [phone]    Mary Kate Mckinley MD        November 15, 2022     Patient: Jillian Aguiar   YOB: 1990   Date of Visit: 11/15/2022       To Whom It May Concern: It is my medical opinion that Kelly Smith should remain out of work until cleared by physician. If you have any questions or concerns, please don't hesitate to call.     Sincerely,        Mary Kate Mckinley MD

## 2022-11-15 NOTE — PROGRESS NOTES
11/15/2022   Chief Complaint   Patient presents with    Knee Pain     Right knee [facility] 11/9/22        History of Present Illness:                             Sal Barajas is a 27 y.o. male who presents today for evaluation of his right knee pain. He had an injury at work on 11/9/2022. He fell with a twisting injury to his right knee. He felt 2 pops and felt his knee band the wrong direction. He had immediate swelling and difficulty with movement and weightbearing at the right knee following the injury. He was seen in the emergency room and x-rays were taken. He was found to have a large knee joint effusion on the initial x-rays. He has been resting the knee and using anti-inflammatory medications and ice which seems to have helped his symptoms a little. He still has feelings of instability at the knee and tightness with pain at the extremes of motion he still walking with a limp. Patient presents to the office today for evaluation of the right knee injury [facility] DOI 11/9/22. Pt states he was a work when he fell and landed on his right knee. Pt states his knee twisted and felt two pops in the right knee. Pt states since then he has had increase pain along the medial aspect of the knee. pt states he feel like his knee is going to give out on him. Pt has been taking naproxen and using ice with very mild relief. Medical History  Patient's medications, allergies, past medical, surgical, social and family histories were reviewed and updated as appropriate.     Past Medical History:   Diagnosis Date    Anxiety     Depression     GERD (gastroesophageal reflux disease)     Takes omeprazole    No pertinent past medical history     Sleep apnea     CPAP - no pulmonologist     Past Surgical History:   Procedure Laterality Date    COLONOSCOPY  2000    Normal exam per pt - Yissel Golder    EYE SURGERY Right 1990's    Surgery for \"lazy eye\"    FOOT FRACTURE SURGERY Right 8/12/2020    RIGHT FOOT OPEN REDUCTION INTERNAL FIXATION OF FIFTH METATARSAL BONE performed by Jorge Garcia MD at [facility] OR     Family History   Problem Relation Age of Onset    No Known Problems Mother     No Known Problems Father      Social History     Socioeconomic History    Marital status:      Spouse name: None    Number of children: None    Years of education: None    Highest education level: None   Tobacco Use    Smoking status: Never    Smokeless tobacco: Never   Vaping Use    Vaping Use: Never used   Substance and Sexual Activity    Alcohol use: No    Drug use: No     Current Outpatient Medications   Medication Sig Dispense Refill    omeprazole (PRILOSEC) 40 MG delayed release capsule       escitalopram (LEXAPRO) 10 MG tablet Take 10 mg by mouth daily      Multiple Vitamins-Minerals (THERAPEUTIC MULTIVITAMIN-MINERALS) tablet Take 1 tablet by mouth daily      montelukast (SINGULAIR) 10 MG tablet Take 10 mg by mouth nightly      traZODone (DESYREL) 100 MG tablet Take 100 mg by mouth nightly       No current facility-administered medications for this visit. No Known Allergies      Review of Systems   Constitutional:  Negative for chills and fever. HENT:  Negative for congestion and sneezing. Eyes:  Negative for pain and redness. Respiratory:  Negative for chest tightness, shortness of breath and wheezing. Cardiovascular:  Negative for chest pain and palpitations. Gastrointestinal:  Negative for vomiting. Musculoskeletal:  Positive for gait problem and joint swelling. Negative for arthralgias. Skin:  Negative for color change and rash. Neurological:  Negative for weakness and numbness. Psychiatric/Behavioral:  Negative for agitation. The patient is not nervous/anxious. Examination:  General Exam:  Vitals: /88   Resp 15   Ht 6' (1.829 m)   Wt (!) 305 lb (138.3 kg)   BMI 41.37 kg/m²    Physical Exam  Vitals and nursing note reviewed.    Constitutional: Appearance: Normal appearance. HENT:      Head: Normocephalic and atraumatic. Eyes:      Conjunctiva/sclera: Conjunctivae normal.      Pupils: Pupils are equal, round, and reactive to light. Pulmonary:      Effort: Pulmonary effort is normal.   Musculoskeletal:      Cervical back: Normal range of motion. Right hip: No deformity, tenderness, bony tenderness or crepitus. Normal range of motion. Normal strength. Left hip: Normal.      Left knee: No swelling, deformity, effusion, ecchymosis or lacerations. Normal range of motion. No tenderness. No medial joint line or lateral joint line tenderness. No LCL laxity or MCL laxity. Normal alignment and normal meniscus. Comments: Right Lower Extremity:    There is moderate diffuse tenderness at the knee joint and severe tenderness maximally along the anterior/medial joint line. There is a moderate effusion present at the knee. Range of motion is from full extension to 110 degrees of flexion and limited at terminal flexion due to pain. There is a positive medial Riki's test with pain and mild clicking. There is a negative anterior drawer and Lachman test however this is difficult to interpret due to patient pain level and guarding. Negative posterior drawer test.  No laxity during valgus stress testing at full extension and 30 degrees of flexion. No laxity with varus stress test.  Skin is intact with no lacerations. No erythema or rash. Strength at the knee is 5 out of 5 with flexion and extension however testing is limited due to pain. Sensation to light touch is intact throughout. Pulses intact       Skin:     General: Skin is warm and dry. Neurological:      Mental Status: He is alert and oriented to person, place, and time.    Psychiatric:         Mood and Affect: Mood normal.         Behavior: Behavior normal.          Diagnostic testing:  X-ray images were reviewed by myself and discussed with the patient:  XR KNEE RIGHT (MIN 4 VIEWS)    Result Date: 11/15/2022  4 views of the Left Knee: There is no evidence of fracture or degenerative joint disease. There is normal alignment of the tibiofemoral and patellofemoral joints. Mild joint effusion present normal bone density. No loose body. Impression: Normal left knee with mild effusion        Office Procedures:  Orders Placed This Encounter   Procedures    MRI KNEE RIGHT WO CONTRAST     Standing Status:   Future     Standing Expiration Date:   11/15/2023       Assessment and Plan  1. right knee sprain    Based on the history and my physical exam, I have a high index of suspicion for structural disruption of the knee such as a tear of the meniscus. I feel it is medically necessary to obtain an MRI to evaluate for tear of the medial meniscus and other intra-articular pathology such injury to the collateral ligaments, cartilage surfaces, or presence of loose body. I have instructed the patient on gentle range of motion exercises for the knee and avoidance of strenuous or painful activities. They will follow-up after the MRI for a review of the results. I recommend that he remain off work at this time and continue with his anti-inflammatory medications and weightbearing as tolerated with avoidance of painful or rotational movements of the knee.               Electronically signed by Joseph Craven MD on 11/15/2022 at 4:26 PM

## 2022-11-18 ENCOUNTER — HOSPITAL ENCOUNTER (OUTPATIENT)
Dept: MRI IMAGING | Age: 32
Discharge: HOME | End: 2022-11-18
Payer: COMMERCIAL

## 2022-11-18 DIAGNOSIS — S83.91XA SPRAIN OF RIGHT KNEE, UNSPECIFIED LIGAMENT, INITIAL ENCOUNTER: ICD-10-CM

## 2022-11-18 PROCEDURE — 73721 MRI JNT OF LWR EXTRE W/O DYE: CPT

## 2022-12-06 ENCOUNTER — TELEPHONE (OUTPATIENT)
Dept: ORTHOPEDIC SURGERY | Age: 32
End: 2022-12-06

## 2022-12-06 ENCOUNTER — OFFICE VISIT (OUTPATIENT)
Dept: ORTHOPEDIC SURGERY | Age: 32
End: 2022-12-06

## 2022-12-06 VITALS
HEIGHT: 72 IN | WEIGHT: 305 LBS | DIASTOLIC BLOOD PRESSURE: 84 MMHG | BODY MASS INDEX: 41.31 KG/M2 | SYSTOLIC BLOOD PRESSURE: 132 MMHG | RESPIRATION RATE: 15 BRPM

## 2022-12-06 DIAGNOSIS — S83.005A DISLOCATION OF LEFT PATELLA, INITIAL ENCOUNTER: ICD-10-CM

## 2022-12-06 DIAGNOSIS — S82.092A OTHER CLOSED FRACTURE OF LEFT PATELLA, INITIAL ENCOUNTER: Primary | ICD-10-CM

## 2022-12-06 NOTE — PROGRESS NOTES
12/6/2022   Chief Complaint   Patient presents with    Knee Pain     Right knee MRI        History of Present Illness:                             Alice Plata is a 27 y.o. male who returns today for follow-up of his right knee. He recently had an MRI and is here today for a review of the results. He has noticed some improvement in his function and mobility at the knee but still has some mild tightness and swelling especially over the anterior medial aspect of the knee where there is persistent tenderness. He denies any ongoing instability issues      Patient returns to the office today for FU of the right knee [facility] 11/9/22. Pt states with movement pain is a 5/10 he does notice a sharp stabbing pain in the knee when kneeling or going up and down the stairs. Pt states                  Medical History  Patient's medications, allergies, past medical, surgical, social and family histories were reviewed and updated as appropriate.     Past Medical History:   Diagnosis Date    Anxiety     Depression     GERD (gastroesophageal reflux disease)     Takes omeprazole    No pertinent past medical history     Sleep apnea     CPAP - no pulmonologist     Past Surgical History:   Procedure Laterality Date    COLONOSCOPY  2000    Normal exam per pt - Vanesa Zhang    EYE SURGERY Right 1990's    Surgery for \"lazy eye\"    FOOT FRACTURE SURGERY Right 8/12/2020    RIGHT FOOT OPEN REDUCTION INTERNAL FIXATION OF FIFTH METATARSAL BONE performed by Ruby Galvan MD at [facility] OR     Family History   Problem Relation Age of Onset    No Known Problems Mother     No Known Problems Father      Social History     Socioeconomic History    Marital status:    Tobacco Use    Smoking status: Never    Smokeless tobacco: Never   Vaping Use    Vaping Use: Never used   Substance and Sexual Activity    Alcohol use: No    Drug use: No     Current Outpatient Medications   Medication Sig Dispense Refill    omeprazole (PRILOSEC) 40 MG delayed release capsule       escitalopram (LEXAPRO) 10 MG tablet Take 10 mg by mouth daily      Multiple Vitamins-Minerals (THERAPEUTIC MULTIVITAMIN-MINERALS) tablet Take 1 tablet by mouth daily      montelukast (SINGULAIR) 10 MG tablet Take 10 mg by mouth nightly      traZODone (DESYREL) 100 MG tablet Take 100 mg by mouth nightly       No current facility-administered medications for this visit. No Known Allergies      Review of Systems   Constitutional:  Negative for activity change and fever. Respiratory:  Negative for chest tightness and shortness of breath. Cardiovascular:  Negative for chest pain. Musculoskeletal:  Positive for gait problem. Negative for arthralgias. Skin:  Negative for color change. Neurological:  Negative for dizziness, weakness and numbness. Psychiatric/Behavioral:  The patient is not nervous/anxious. Examination:  General Exam:  Vitals: /84   Resp 15   Ht 6' (1.829 m)   Wt (!) 305 lb (138.3 kg)   BMI 41.37 kg/m²    Physical Exam     Right lower extremity:  There is improved but persistent tenderness to palpation at the anteromedial aspect of the knee overlying the medial border the patella and anteromedial joint line. Strength is 5-5 with knee flexion and extension. No instability or malalignment of the patellofemoral joint. No gross instability during active range of motion of the knee at the patellofemoral joint. Range of motion is full in extension and mildly restricted terminal flexion with pain referred to the medial joint line and medial patella. Sensation and motor functions intact distally. Calf is soft and nontender.       Diagnostic testing:    MRI images of the right knee were reviewed by myself and discussed with the patient today:    Impression   Findings compatible with transient patellar dislocation with kissing   contusions involving inferomedial patella and anterolateral aspect of lateral femoral condyle with superimposed avulsion fracture to the inferomedial   patella and mild impaction fracture to the anterolateral aspect of lateral   femoral condyle. There is also low-grade injury of the medial patellar   retinaculum. Intact lateral collateral ligament complex and medial collateral ligament but   with mild edema overlying the iliotibial band as well as the MCL. Findings   could relate to the transient patellar dislocation or could reflect low-grade   sprains. Moderately sized knee joint effusion which appears to have a predominantly T1   hyperintense component concerning for hemorrhagic joint effusion likely   relating to the fractures noted above. Office Procedures:  No orders of the defined types were placed in this encounter. Assessment and Plan  1. right knee lateral patellar dislocation    2. Right knee medial patellar avulsion fracture    3. Right knee lateral femoral condyle bone contusion    We reviewed her his MRI which showed avulsion fracture of the patella and evidence of prior instability event with lateral dislocation. I recommended that we proceed with continued nonoperative management of this injury. I have reassured him that he appears to be healing appropriately well at this stage and I expect him to complete healing process without surgical intervention. I recommended that we proceed with physical therapy and we will request formal course of therapy through [facility]. He can proceed with strengthening activities and advancement of his rehabilitation as tolerated at the guidance of therapy. I would like him to be able to return to work next week with restrictions. No lifting pushing or pulling greater than 40 pounds. No climbing squatting or kneeling. These will be in place till his next visit.   Follow-up in 6-week    Electronically signed by Elif Floyd MD on 12/6/2022 at 11:26 AM

## 2022-12-06 NOTE — PROGRESS NOTES
Patient returns to the office today for FU of the right knee [facility] DOI 11/9/22. Pt states with movement pain is a 5/10 he does notice a sharp stabbing pain in the knee when kneeling or going up and down the stairs. Pt states         Impression   Findings compatible with transient patellar dislocation with kissing   contusions involving inferomedial patella and anterolateral aspect of lateral   femoral condyle with superimposed avulsion fracture to the inferomedial   patella and mild impaction fracture to the anterolateral aspect of lateral   femoral condyle. There is also low-grade injury of the medial patellar   retinaculum. Intact lateral collateral ligament complex and medial collateral ligament but   with mild edema overlying the iliotibial band as well as the MCL. Findings   could relate to the transient patellar dislocation or could reflect low-grade   sprains. Moderately sized knee joint effusion which appears to have a predominantly T1   hyperintense component concerning for hemorrhagic joint effusion likely   relating to the fractures noted above.

## 2022-12-06 NOTE — PATIENT INSTRUCTIONS
Continue weight-bearing as tolerated. Continue range of motion exercises as instructed. Ice and elevate as needed. Tylenol or Motrin for pain.    Follow up 6 weeks  Physical therapy ordered  Return to work on Monday   No climbing or kneeling, squatting, no lifting pushing or pulling greater than  40lbs for 6 weeks

## 2022-12-06 NOTE — LETTER
[facility]  [address]  Phone: [phone]  Fax: [phone]    Jorge Garcia MD        December 6, 2022     Patient: Jazzy Askew   YOB: 1990   Date of Visit: 12/6/2022       To Whom It May Concern: It is my medical opinion that Tabatha Munroe may return to work on 12/12/22 with the following restrictions: lifting/carrying not to exceed 40 lbs. , pushing/pulling not to exceed 40 lbs. , no kneeling, squatting or climbing stairs for 6 weeks (until 1/17/23). If you have any questions or concerns, please don't hesitate to call.     Sincerely,        Jorge Garcia MD

## 2022-12-06 NOTE — TELEPHONE ENCOUNTER
Please get PT approved. Pt will return to work on 12/12/2.  With no lifting pushing or pulling greater than 40lbs no kneeling squatting or climbing the stairs

## 2022-12-07 PROBLEM — S82.002A CLOSED FRACTURE OF LEFT PATELLA: Status: ACTIVE | Noted: 2022-12-07

## 2022-12-07 PROBLEM — S83.005A DISLOCATION OF LEFT PATELLA: Status: ACTIVE | Noted: 2022-12-07

## 2022-12-07 ASSESSMENT — ENCOUNTER SYMPTOMS
COLOR CHANGE: 0
SHORTNESS OF BREATH: 0
CHEST TIGHTNESS: 0

## 2022-12-12 DIAGNOSIS — S82.092A OTHER CLOSED FRACTURE OF LEFT PATELLA, INITIAL ENCOUNTER: Primary | ICD-10-CM

## 2022-12-12 DIAGNOSIS — S83.005A DISLOCATION OF LEFT PATELLA, INITIAL ENCOUNTER: ICD-10-CM

## 2022-12-12 DIAGNOSIS — S83.91XA SPRAIN OF RIGHT KNEE, UNSPECIFIED LIGAMENT, INITIAL ENCOUNTER: ICD-10-CM

## 2022-12-13 ENCOUNTER — TELEPHONE (OUTPATIENT)
Dept: ORTHOPEDIC SURGERY | Age: 32
End: 2022-12-13

## 2022-12-13 NOTE — LETTER
[facility]  [address]  Phone: [phone]  Fax: [phone]    Joseph Craven MD        December 13, 2022     Patient: Nicko Muro   YOB: 1990   Date of Visit: 12/13/2022       To Whom It May Concern: It is my medical opinion that Halle Joshua may return to full duty immediately with no restrictions. If you have any questions or concerns, please don't hesitate to call.     Sincerely,        Joseph Craven MD

## 2022-12-23 ENCOUNTER — HOSPITAL ENCOUNTER (OUTPATIENT)
Dept: PHYSICAL THERAPY | Age: 32
Setting detail: THERAPIES SERIES
Discharge: HOME | End: 2022-12-23

## 2023-01-17 ENCOUNTER — OFFICE VISIT (OUTPATIENT)
Dept: ORTHOPEDIC SURGERY | Age: 33
End: 2023-01-17

## 2023-01-17 VITALS — RESPIRATION RATE: 15 BRPM | BODY MASS INDEX: 41.45 KG/M2 | WEIGHT: 306 LBS | HEIGHT: 72 IN

## 2023-01-17 DIAGNOSIS — S83.005A DISLOCATION OF LEFT PATELLA, INITIAL ENCOUNTER: Primary | ICD-10-CM

## 2023-01-17 DIAGNOSIS — S83.91XA SPRAIN OF RIGHT KNEE, UNSPECIFIED LIGAMENT, INITIAL ENCOUNTER: ICD-10-CM

## 2023-01-17 NOTE — PROGRESS NOTES
Patient returns to the office today for FU of the right knee patella [facility] DOI 11/9/22. Pt states pain today is a 0/10 overall he is doing well. At times he will have a sharp pain with twisting the knee over he is doing well.

## 2023-01-18 ASSESSMENT — ENCOUNTER SYMPTOMS
SHORTNESS OF BREATH: 0
COLOR CHANGE: 0
CHEST TIGHTNESS: 0

## 2023-01-18 NOTE — PROGRESS NOTES
1/17/2023   Chief Complaint   Patient presents with    Knee Pain     Right knee patella [facility] DOI 11/9/22        History of Present Illness:                             Callie Nobles is a 27 y.o. male who returns today for follow-up of his right knee. He has noticed a good improvement in his knee. He has residual mild soreness along the anteromedial aspect of his knee but has noticed return of his function. He has no complaints of instability or stiffness at the knee. He has good strength and full range of motion present at the knee. Patient returns to the office today for FU of the right knee patella [facility] DOI 11/9/22. Pt states pain today is a 0/10 overall he is doing well. At times he will have a sharp pain with twisting the knee over he is doing well. Medical History  Patient's medications, allergies, past medical, surgical, social and family histories were reviewed and updated as appropriate. Review of Systems   Constitutional:  Negative for activity change and fever. Respiratory:  Negative for chest tightness and shortness of breath. Cardiovascular:  Negative for chest pain. Skin:  Negative for color change. Neurological:  Negative for dizziness. Psychiatric/Behavioral:  The patient is not nervous/anxious. Examination:  General Exam:  Vitals: Resp 15   Ht 6' (1.829 m)   Wt (!) 306 lb (138.8 kg)   BMI 41.50 kg/m²    Physical Exam     Right lower extremity:  Improved minimal tenderness palpation of the anterior medial aspect of the knee adjacent to the medial patellofemoral joint. Full painless active range of motion present the knee with flexion and extension. Strength is 5 x 5 with knee flexion and extension. No instability at the knee with active range of motion or gentle stress examination. Normal patellofemoral tracking. No joint effusion or soft tissue swelling.       Office Procedures:  No orders of the defined types were placed in this encounter. Assessment and Plan  1. right knee sprain, patellar subluxation    2. Right knee medial patellar avulsion fracture    He has responded well to conservative treatments and rehabilitation of the knee. I have reassured him that that is normal left some mild soreness in the knee at this stage of the healing however I do expect to completely resolve over time. Encouraged to continue working with his rehabilitation to improve strength and range of motion of the knee. He can advance his activities as tolerated. He can return to work duties without any restrictions.   Follow-up as needed          Electronically signed by Earney Severs, MD on 1/18/2023 at 12:55 PM

## 2024-10-05 ENCOUNTER — HOSPITAL ENCOUNTER (EMERGENCY)
Age: 34
Discharge: HOME | End: 2024-10-05
Attending: EMERGENCY MEDICINE
Payer: COMMERCIAL

## 2024-10-05 VITALS
TEMPERATURE: 100.6 F | SYSTOLIC BLOOD PRESSURE: 149 MMHG | RESPIRATION RATE: 20 BRPM | HEIGHT: 72 IN | WEIGHT: 315 LBS | HEART RATE: 106 BPM | OXYGEN SATURATION: 93 % | DIASTOLIC BLOOD PRESSURE: 80 MMHG | BODY MASS INDEX: 42.66 KG/M2

## 2024-10-05 DIAGNOSIS — J01.90 ACUTE NON-RECURRENT SINUSITIS, UNSPECIFIED LOCATION: Primary | ICD-10-CM

## 2024-10-05 PROCEDURE — 6370000000 HC RX 637 (ALT 250 FOR IP): Performed by: EMERGENCY MEDICINE

## 2024-10-05 PROCEDURE — 99283 EMERGENCY DEPT VISIT LOW MDM: CPT

## 2024-10-05 RX ORDER — AZITHROMYCIN 250 MG/1
500 TABLET, FILM COATED ORAL ONCE
Status: COMPLETED | OUTPATIENT
Start: 2024-10-05 | End: 2024-10-05

## 2024-10-05 RX ORDER — PREDNISONE 20 MG/1
60 TABLET ORAL ONCE
Status: COMPLETED | OUTPATIENT
Start: 2024-10-05 | End: 2024-10-05

## 2024-10-05 RX ORDER — IBUPROFEN 400 MG/1
800 TABLET, FILM COATED ORAL ONCE
Status: COMPLETED | OUTPATIENT
Start: 2024-10-05 | End: 2024-10-05

## 2024-10-05 RX ORDER — AZITHROMYCIN 250 MG/1
TABLET, FILM COATED ORAL
Qty: 1 PACKET | Refills: 0 | Status: SHIPPED | OUTPATIENT
Start: 2024-10-05 | End: 2024-10-15

## 2024-10-05 RX ORDER — CETIRIZINE HYDROCHLORIDE, PSEUDOEPHEDRINE HYDROCHLORIDE 5; 120 MG/1; MG/1
1 TABLET, FILM COATED, EXTENDED RELEASE ORAL 2 TIMES DAILY
Qty: 20 TABLET | Refills: 0 | Status: SHIPPED | OUTPATIENT
Start: 2024-10-05 | End: 2024-10-15

## 2024-10-05 RX ORDER — BUPROPION HYDROCHLORIDE 150 MG/1
150 TABLET ORAL DAILY
COMMUNITY
Start: 2024-04-08

## 2024-10-05 RX ORDER — METHYLPREDNISOLONE 4 MG
TABLET, DOSE PACK ORAL
Qty: 1 KIT | Refills: 0 | Status: SHIPPED | OUTPATIENT
Start: 2024-10-05

## 2024-10-05 RX ADMIN — AZITHROMYCIN DIHYDRATE 500 MG: 250 TABLET ORAL at 20:54

## 2024-10-05 RX ADMIN — IBUPROFEN 800 MG: 400 TABLET, FILM COATED ORAL at 20:54

## 2024-10-05 RX ADMIN — PREDNISONE 60 MG: 20 TABLET ORAL at 20:54

## 2024-10-05 ASSESSMENT — LIFESTYLE VARIABLES
HOW MANY STANDARD DRINKS CONTAINING ALCOHOL DO YOU HAVE ON A TYPICAL DAY: PATIENT DOES NOT DRINK
HOW OFTEN DO YOU HAVE A DRINK CONTAINING ALCOHOL: NEVER

## 2024-10-05 ASSESSMENT — ENCOUNTER SYMPTOMS
NAUSEA: 1
EYES NEGATIVE: 1
SORE THROAT: 1
RESPIRATORY NEGATIVE: 1

## 2024-10-05 ASSESSMENT — PAIN DESCRIPTION - ORIENTATION: ORIENTATION: MID

## 2024-10-05 ASSESSMENT — PAIN - FUNCTIONAL ASSESSMENT: PAIN_FUNCTIONAL_ASSESSMENT: 0-10

## 2024-10-05 ASSESSMENT — PAIN DESCRIPTION - DESCRIPTORS: DESCRIPTORS: BURNING;SHARP;SHOOTING

## 2024-10-05 ASSESSMENT — PAIN DESCRIPTION - PAIN TYPE: TYPE: ACUTE PAIN

## 2024-10-05 ASSESSMENT — PAIN SCALES - GENERAL: PAINLEVEL_OUTOF10: 6

## 2024-10-05 ASSESSMENT — PAIN DESCRIPTION - LOCATION: LOCATION: CHEST;FACE;HEAD

## (undated) DEVICE — Device

## (undated) DEVICE — APPLICATOR MEDICATED 26 CC SOLUTION HI LT ORNG CHLORAPREP

## (undated) DEVICE — BANDAGE GZ W2XL75IN ST RAYON POLY CNFRM STRTCH LTWT

## (undated) DEVICE — ALCOHOL RUBBING ISO 16OZ 70%

## (undated) DEVICE — BANDAGE,ELASTIC,ESMARK,STERILE,4"X9',LF: Brand: MEDLINE

## (undated) DEVICE — SUTURE ETHLN SZ 3-0 L18IN NONABSORBABLE BLK FS-1 L24MM 3/8 663H

## (undated) DEVICE — DRESSING,GAUZE,XEROFORM,CURAD,1"X8",ST: Brand: CURAD

## (undated) DEVICE — ZIMMER® STERILE DISPOSABLE TOURNIQUET CUFF WITH PLC, DUAL PORT, SINGLE BLADDER, 34 IN. (86 CM)

## (undated) DEVICE — ELECTRODE ES AD CRDLSS PT RET REM POLYHESIVE

## (undated) DEVICE — PENCIL ES CRD L10FT HND SWCHING ROCK SWCH W/ EDGE COAT BLDE

## (undated) DEVICE — CONTAINER,SPECIMEN,OR STERILE,4OZ: Brand: MEDLINE

## (undated) DEVICE — SYRINGE MED 20ML STD CLR PLAS LUERLOCK TIP N CTRL DISP

## (undated) DEVICE — PADDING,UNDERCAST,COTTON, 4"X4YD STERILE: Brand: MEDLINE

## (undated) DEVICE — COUNTER NDL 30 COUNT FOAM STRP SGL MAG

## (undated) DEVICE — SUTURE VCRL SZ 2-0 L27IN ABSRB UD L26MM CT-2 1/2 CIR J269H

## (undated) DEVICE — COTTON UNDERCAST PADDING,REGULAR FINISH: Brand: WEBRIL

## (undated) DEVICE — TUBING, SUCTION, 9/32" X 10', STRAIGHT: Brand: MEDLINE

## (undated) DEVICE — 3M™ STERI-DRAPE™ U-DRAPE 1015: Brand: STERI-DRAPE™

## (undated) DEVICE — 34" SINGLE PATIENT USE HOVERMATT BREATHABLE: Brand: SINGLE PATIENT USE HOVERMATT

## (undated) DEVICE — TOWEL,OR,DSP,ST,BLUE,STD,6/PK,12PK/CS: Brand: MEDLINE

## (undated) DEVICE — TAPE CAST W4INXL4YD WHT RESIN FBRGLS H2O ACT TACK FREE

## (undated) DEVICE — CONMED ACCESSORY ELECTRODE, NEEDLE WITH EXTENDED INSULATION: Brand: CONMED

## (undated) DEVICE — VITAL SIGNS™ EXTRA LARGE MASK WITH ADJUSTABLE AIR CUSHION SIZE 6: Brand: VITAL SIGNS™

## (undated) DEVICE — DRAPE SHEET ULTRAGARD: Brand: MEDLINE

## (undated) DEVICE — DRAPE,U/ SHT,SPLIT,PLAS,STERIL: Brand: MEDLINE

## (undated) DEVICE — SUTURE VCRL SZ 3-0 L27IN ABSRB UD L24MM PS-1 3/8 CIR PRIM J936H

## (undated) DEVICE — MARKER SURG SKIN UTIL REGULAR/FINE 2 TIP W/ RUL AND 9 LBL

## (undated) DEVICE — PAD,ABDOMINAL,5"X9",ST,LF,25/BX: Brand: MEDLINE INDUSTRIES, INC.

## (undated) DEVICE — BANDAGE COMPR M W6INXL10YD WHT BGE VELC E MTRX HK AND LOOP

## (undated) DEVICE — DRAPE,EXTREMITY,89X128,STERILE: Brand: MEDLINE

## (undated) DEVICE — SPONGE GZ W4XL8IN COT WVN 12 PLY

## (undated) DEVICE — INTENDED FOR TISSUE SEPARATION, AND OTHER PROCEDURES THAT REQUIRE A SHARP SURGICAL BLADE TO PUNCTURE OR CUT.: Brand: BARD-PARKER ® STAINLESS STEEL BLADES

## (undated) DEVICE — SUTURE ETHLN SZ 4-0 L18IN NONABSORBABLE BLK L19MM PS-2 3/8 1667H

## (undated) DEVICE — SPLINT ORTH W5XL30IN WHT FBRGLS 1 SIDE FELT PD CNFRM LO

## (undated) DEVICE — GAUZE,SPONGE,4"X4",16PLY,XRAY,STRL,LF: Brand: MEDLINE

## (undated) DEVICE — BANDAGE,ELASTIC,ESMARK,STERILE,6"X9',LF: Brand: MEDLINE

## (undated) DEVICE — YANKAUER,FLEXIBLE HANDLE,REGLR CAPACITY: Brand: MEDLINE INDUSTRIES, INC.